# Patient Record
Sex: MALE | Race: WHITE
[De-identification: names, ages, dates, MRNs, and addresses within clinical notes are randomized per-mention and may not be internally consistent; named-entity substitution may affect disease eponyms.]

---

## 2018-04-29 ENCOUNTER — HOSPITAL ENCOUNTER (EMERGENCY)
Dept: HOSPITAL 39 - ER | Age: 28
Discharge: HOME | End: 2018-04-29
Payer: COMMERCIAL

## 2018-04-29 VITALS — OXYGEN SATURATION: 98 % | DIASTOLIC BLOOD PRESSURE: 70 MMHG | TEMPERATURE: 98.5 F | SYSTOLIC BLOOD PRESSURE: 157 MMHG
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
## 2018-04-29 DIAGNOSIS — S63.501A: Primary | ICD-10-CM

## 2018-04-29 NOTE — RAD
EXAM DESCRIPTION: 



Hand,Right 3 Views



CLINICAL HISTORY: 



picked something up and heard a popping noise



COMPARISON: 



None



FINDINGS: 



3 view(s) submitted. No fracture or dislocation is identified.

Bone marrow attenuation is unremarkable. No radiopaque foreign

body is identified.



IMPRESSION: 



No acute fracture or dislocation.



Electronically signed by:  Mark Halsted  4/29/2018 10:45 PM CDT

Workstation: [id number]

## 2018-04-29 NOTE — ED.PDOC
History of Present Illness





- General


Chief Complaint: Upper Extremity Injury


Stated Complaint: right hand pain


Time Seen by Provider: 04/29/18 22:17


Source: patient


Exam Limitations: no limitations





- History of Present Illness


Initial Comments: 





Ney Loera 28 y/o male stated that while carrying his toolbox with right 

hand felt popping and sharp pain on his right wrist and drop his toolbox which 

happened this afternoon today.No numbness or weakness.had history of wrist 

fracture 6 years ago and snake bite on right hand.Denies .chronic medical 

problem.


Occurred: just prior to arrival


Pain - Upper Extremity: moderate: Wrist, right


Method of Injury: unknown, other - see hpi


Improving Factors: rest


Worsening Factors: movement


Allergies/Adverse Reactions: 


Allergies





NO KNOWN ALLERGY Allergy (Verified 04/29/18 22:15)


 








Home Medications: 


Ambulatory Orders





NK [NK]  11/22/16 











Review of Systems





- Review of Systems


Constitutional: States: no symptoms reported


EENTM: States: no symptoms reported


Musculoskeletal: States: see HPI


Neurological: States: no symptoms reported


All other Systems: Reviewed and Negative, No Change from Baseline





Past Medical History (General)





- Patient Medical History


Hx Seizures: No


Hx Stroke: No


Hx Dementia: No


Hx Asthma: No


Hx of COPD: No


Hx Cardiac Disorders: No


Hx Congestive Heart Failure: No


Hx Pacemaker: No


Hx Hypertension: No


Hx Thyroid Disease: No


Hx Diabetes: No


Hx Gastroesophageal Reflux: No


Hx Renal Disease: No


Hx Cancer: No


Hx of HIV: No


Hx Hepatitis C: No


Hx MRSA: No


Surgical History: no surgical history





- Vaccination History


Hx Tetanus, Diphtheria Vaccination: Yes


Hx Influenza Vaccination: No


Hx Pneumococcal Vaccination: No





- Social History


Hx Tobacco Use: Yes


Hx Alcohol Use: Yes - occasional


Hx Substance Use: No


Hx Substance Use Treatment: No


Hx Depression: No


Hx Physical Abuse: No


Hx Emotional Abuse: No





- Female History


Patient Pregnant: No





Family Medical History





- Family History


  ** Mother


Family History: No Known


Living Status: Still Living





Physical Exam





- Physical Exam


General Appearance: Alert, Comfortable, No apparent distress


Eyes, Ears, Nose, Throat Exam: normal ENT inspection


Neck: supple


Cardiovascular/Respiratory: no M/R/G, normal peripheral pulses


Abdominal Exam: non-tender


Back Exam: no vertebral tenderness


Shoulder Exam: non-tender


Elbow/Forearm Exam: non-tender


Wrist Exam: bone tenderness - right wrist, limited ROM - right wrist joint;

tenderness and knot felt mid carpal wrist joint flexion/extension, nodules, pain


Hand Exam: non-tender, no evidence of injury


Neuro/Tendon: normal sensation, normal motor functions


Mental Status: alert, oriented x 3


Skin Exam: normal color, warm/dry





Progress





- Progress


Progress: 





04/29/18 22:34


 Vital Signs - 8 hr











  04/29/18





  22:11


 


Temperature 98.5 F


 


Pulse Rate [ 72





monitor] 


 


Respiratory 18





Rate 


 


Blood Pressure 157/70





[Left Arm] 


 


O2 Sat by Pulse 98





Oximetry 














- EKG/XRAY/CT


XRAY: wrist right no fracture





Departure





- Departure


Clinical Impression: 


Sprain of right wrist


Qualifiers:


 Encounter type: initial encounter Qualified Code(s): S63.501A - Unspecified 

sprain of right wrist, initial encounter





Time of Disposition: 23:07


Disposition: Discharge to Home or Self Care


Condition: Good


Departure Forms:  ED Discharge - Pt. Copy, Patient Portal Self Enrollment


Instructions:  DI for Wrist Sprain, DI for Wrist Strain, DI for Wrist Pain


Activity: no lifting, no pushing/pulling with affected limb - until seen by 

ortho Md


Home Medications: 


Ambulatory Orders





NK [NK]  11/22/16 








Additional Instructions: 


Need to call primary Md in am for referral to ortho Hand specialist Elevate 

right hand 20 degrees at bedtime;Continue with ice pack for 20 minutes during 

WAKING hours only 3 x a day as needed for 3 days;May Take Aleve (otc)1-2 

tablets am/pm for pain as needed

## 2020-10-08 ENCOUNTER — HOSPITAL ENCOUNTER (EMERGENCY)
Dept: HOSPITAL 39 - ER | Age: 30
Discharge: HOME | End: 2020-10-08
Payer: SELF-PAY

## 2020-10-08 VITALS — OXYGEN SATURATION: 99 %

## 2020-10-08 VITALS — DIASTOLIC BLOOD PRESSURE: 82 MMHG | SYSTOLIC BLOOD PRESSURE: 124 MMHG

## 2020-10-08 VITALS — TEMPERATURE: 97.1 F
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
## 2020-10-08 DIAGNOSIS — L03.113: Primary | ICD-10-CM

## 2020-10-08 DIAGNOSIS — S61.411D: ICD-10-CM

## 2020-10-08 DIAGNOSIS — Z87.891: ICD-10-CM

## 2020-10-08 PROCEDURE — 90471 IMMUNIZATION ADMIN: CPT

## 2020-10-08 PROCEDURE — 90715 TDAP VACCINE 7 YRS/> IM: CPT

## 2020-10-08 PROCEDURE — 73130 X-RAY EXAM OF HAND: CPT

## 2020-10-08 NOTE — ED.PDOC
History of Present Illness





- General


Chief Complaint: Upper Extremity Injury


Stated Complaint: right hand cut 2wks ago, now swollen


Time Seen by Provider: 10/08/20 12:02


Source: patient, RN notes reviewed, Vital Signs reviewed


Exam Limitations: no limitations





- History of Present Illness


Initial Comments: 





Patient is a 29-year-old white male who presents with complaints of right fifth 

knuckle swelling.  Patient cut his hands 2 weeks ago and just placed Neosporin 

to the wound.  Patient states that it was cut down to the bone.  Patient said it

was healing fine but started swelling yesterday.  Patient does not remember when

his last tetanus shot was.  Patient is right-hand dominant.  Patient denies any 

fever or chills.  Patient complains of mild pain in the right hand.  There is no

radiation of the pain.  It is stabbing in nature.  Is worse with movement.  

Better with rest and elevation.








Occurred: other - Patient cut the hand 2 weeks ago.  The swelling and redness 

started yesterday.


Pain - Upper Extremity: mild: Hand, right


Method of Injury: incised


Improving Factors: rest


Worsening Factors: movement


Allergies/Adverse Reactions: 


Allergies





NO KNOWN ALLERGY Allergy (Verified 04/29/18 22:15)


   





Home Medications: 


Ambulatory Orders





Clindamycin HCl 300 mg PO QID #40 cap 10/08/20 











Review of Systems





- Review of Systems


Constitutional: States: no symptoms reported, see HPI.  Denies: chills, fever


EENTM: States: no symptoms reported.  Denies: eye pain, blurred vision, double 

vision


Respiratory: States: no symptoms reported.  Denies: cough, short of breath


Cardiology: States: no symptoms reported.  Denies: chest pain, palpitations, 

syncope


Gastrointestinal/Abdominal: States: no symptoms reported.  Denies: abdominal 

pain, diarrhea, nausea, vomiting


Genitourinary: States: no symptoms reported


Musculoskeletal: States: see HPI


Skin: States: see HPI, change in color


Neurological: States: no symptoms reported.  Denies: numbness, paresthesia, t

ingling, tremors, weakness


Endocrine: States: no symptoms reported


Hematologic/Lymphatic: States: no symptoms reported


All other Systems: Reviewed and Negative





Past Medical History (General)





- Patient Medical History


Hx Seizures: No


Hx Stroke: No


Hx Dementia: No


Hx Asthma: No


Hx of COPD: No


Hx Cardiac Disorders: No


Hx Congestive Heart Failure: No


Hx Pacemaker: No


Hx Hypertension: No


Hx Thyroid Disease: No


Hx Diabetes: No


Hx Gastroesophageal Reflux: No


Hx Renal Disease: No


Hx Cancer: No


Hx of HIV: No


Hx Hepatitis C: No


Hx MRSA: No


Surgical History: no surgical history





- Vaccination History


Hx Tetanus, Diphtheria Vaccination: Yes


Hx Influenza Vaccination: No


Hx Pneumococcal Vaccination: No





- Social History


Hx Tobacco Use: Yes


Hx Alcohol Use: Yes


Hx Substance Use: No


Hx Substance Use Treatment: No


Hx Depression: No


Hx Physical Abuse: No


Hx Emotional Abuse: No





- Activities of Daily Living


Hospice Agency (if applicable):: None





- Female History


Patient is a Female of Child Bearing Age (10 -59 yrs old): No


Patient Pregnant: No





Family Medical History





- Family History


  ** Mother


Family History: No Known


Living Status: Still Living





Physical Exam





- Physical Exam


General Appearance: Alert, Comfortable, Unkempt, Well Developed, Well Hydrated, 

Well Nourished


Eyes, Ears, Nose, Throat Exam: PERRL/EOMI, normal ENT inspection, pharynx normal


Neck: non-tender, full range of motion, supple, normal inspection


Cardiovascular/Respiratory: regular rate, rhythm, no M/R/G, normal peripheral 

pulses, no JVD, normal breath sounds, no respiratory distress


Abdominal Exam: non-tender, no organomegaly


Back Exam: normal inspection, no CVA tenderness, no vertebral tenderness


Shoulder Exam: normal inspection, normal ROM


Elbow/Forearm Exam: normal inspection, non-tender, normal ROM


Wrist Exam: normal inspection, non-tender, no evidence of injury


Hand Exam: infection - Patient with a healing laceration at the


Neuro/Tendon: normal sensation, normal motor functions, normal tendon functions,

responds to pain


Mental Status: alert, oriented x 3


Skin Exam: warm/dry, other - Redness over the right fifth knuckle.





Progress





- Progress


Progress: 


Differential diagnosis: Cellulitis, foreign body, joint infection, necrotizing 

fasciitis among others.








10/08/20 13:05


X-ray does not show any foreign body, fractures or gas in the tissue concerning 

for necrotizing fasciitis.  Additionally unlikely to be gangrene.  I believe the

patient has a simple cellulitis with possible small abscess.  He has full range 

of motion of the knuckle without difficulty, therefore, it is unlikely that he 

has an infected joint space.  Plan on treatment with p.o. antibiotics and 

discharge home.  Patient to follow-up in 2 days for a wound check either here or

to PCPs office.  I discussed this plan of care with the patient and he voices 

understanding and agreement with the plan of care.





Santhosh Jeffery M.D.


#[phone]














- Results/Orders


Results/Orders: 





EXAM DESCRIPTION: Hand,Right 3 Views  CLINICAL HISTORY: right hand swelling  

COMPARISON: Previous x-ray images right hand April 29, 2018  TECHNIQUE: AP, 

LATERAL, AND OBLIQUE  FINDINGS: Three-view tab hand shows no fracture or 

dislocation. Mild soft tissue dorsum of the hand. There is no bone lesion. There

are no significant arthritic changes. There is no radiopaque foreign body.  

IMPRESSION:  Negative for fracture or dislocation.  Electronically signed by: 

Harsh Hansen MD 10/8/2020 12:50














Departure





- Departure


Clinical Impression: 


 Cellulitis of hand, right





Laceration of hand with complication


Qualifiers:


 Encounter type: initial encounter Laterality: right Qualified Code(s): S61.411A

- Laceration without foreign body of right hand, initial encounter





Time of Disposition: 13:07


Disposition: Discharge to Home or Self Care


Condition: Good


Departure Forms:  ED Discharge - Pt. Copy, Patient Portal Self Enrollment


Instructions:  DI for Arm Pain, Cellulitis (Skin Infection), Adult (DC), Wound 

Care (DC)


Diet: resume usual diet


Activity: increase activity as tolerated


Referrals: 


LORENZO HATHAWAY IV, NP [Active Staff] - 1-5 Days (for follow up and a wound 

check.)


Prescriptions: 


Clindamycin HCl 300 mg PO QID #40 cap


Home Medications: 


Ambulatory Orders





Clindamycin HCl 300 mg PO QID #40 cap 10/08/20

## 2020-10-08 NOTE — RAD
EXAM DESCRIPTION: Hand,Right 3 Views



CLINICAL HISTORY: right hand swelling



COMPARISON: Previous x-ray images right hand April 29, 2018



TECHNIQUE: AP, LATERAL, AND OBLIQUE



FINDINGS:

Three-view tab hand shows no fracture or dislocation. Mild soft

tissue dorsum of the hand.

There is no bone lesion.

There are no significant arthritic changes.

There is no radiopaque foreign body.



IMPRESSION:



Negative for fracture or dislocation.



Electronically signed by:  Harsh Hansen MD  10/8/2020 12:50

PM CDT Workstation: [id number]